# Patient Record
Sex: FEMALE | Race: WHITE | URBAN - METROPOLITAN AREA
[De-identification: names, ages, dates, MRNs, and addresses within clinical notes are randomized per-mention and may not be internally consistent; named-entity substitution may affect disease eponyms.]

---

## 2020-11-13 ENCOUNTER — WEB ENCOUNTER (OUTPATIENT)
Dept: URBAN - METROPOLITAN AREA CLINIC 23 | Facility: CLINIC | Age: 55
End: 2020-11-13

## 2020-11-13 RX ORDER — AZATHIOPRINE 50 MG/1
TAKE 2 TABLETS BY ORAL ROUTE DAILY FOR 90 DAYS TABLET ORAL 1
Qty: 180 | Refills: 6
Start: 2019-10-01

## 2020-11-14 PROBLEM — 235856003 DISEASE OF LIVER: Status: ACTIVE | Noted: 2020-11-14

## 2021-01-18 ENCOUNTER — WEB ENCOUNTER (OUTPATIENT)
Dept: URBAN - METROPOLITAN AREA CLINIC 23 | Facility: CLINIC | Age: 56
End: 2021-01-18

## 2021-11-23 ENCOUNTER — ERX REFILL RESPONSE (OUTPATIENT)
Dept: URBAN - METROPOLITAN AREA CLINIC 23 | Facility: CLINIC | Age: 56
End: 2021-11-23

## 2021-11-23 RX ORDER — AZATHIOPRINE 50 MG/1
TAKE 2 TABLETS BY ORAL ROUTE DAILY FOR 90 DAYS TABLET ORAL 1
Qty: 180 | Refills: 6 | OUTPATIENT

## 2021-11-23 RX ORDER — AZATHIOPRINE 50 1/1
TAKE TWO TABLETS BY MOUTH DAILY TABLET ORAL
Qty: 60 TABLET | Refills: 1 | OUTPATIENT

## 2021-12-29 ENCOUNTER — WEB ENCOUNTER (OUTPATIENT)
Dept: URBAN - METROPOLITAN AREA CLINIC 23 | Facility: CLINIC | Age: 56
End: 2021-12-29

## 2021-12-29 RX ORDER — AZATHIOPRINE 50 1/1
2 TABLETS TABLET ORAL ONCE A DAY
Qty: 180 TABLET | Refills: 3
Start: 2021-12-29 | End: 2022-12-24

## 2022-01-05 ENCOUNTER — WEB ENCOUNTER (OUTPATIENT)
Dept: URBAN - METROPOLITAN AREA CLINIC 23 | Facility: CLINIC | Age: 57
End: 2022-01-05

## 2022-01-05 RX ORDER — AZATHIOPRINE 50 1/1
2 TABLETS TABLET ORAL ONCE A DAY
Qty: 60 TABLET | Refills: 0
Start: 2022-01-05 | End: 2022-02-04

## 2022-01-19 ENCOUNTER — WEB ENCOUNTER (OUTPATIENT)
Dept: URBAN - METROPOLITAN AREA CLINIC 23 | Facility: CLINIC | Age: 57
End: 2022-01-19

## 2022-01-19 RX ORDER — AZATHIOPRINE 50 1/1
2 TABLETS TABLET ORAL ONCE A DAY
Qty: 60 TABLET | Refills: 0
Start: 2022-01-05 | End: 2022-02-18

## 2022-03-01 ENCOUNTER — TELEPHONE ENCOUNTER (OUTPATIENT)
Dept: URBAN - METROPOLITAN AREA CLINIC 23 | Facility: CLINIC | Age: 57
End: 2022-03-01

## 2022-03-01 RX ORDER — AZATHIOPRINE 50 1/1
2 TABLETS TABLET ORAL ONCE A DAY
Qty: 60 TABLET | Refills: 0
Start: 2022-01-05 | End: 2022-03-31

## 2022-03-15 ENCOUNTER — OFFICE VISIT (OUTPATIENT)
Dept: URBAN - METROPOLITAN AREA CLINIC 23 | Facility: CLINIC | Age: 57
End: 2022-03-15
Payer: COMMERCIAL

## 2022-03-15 ENCOUNTER — WEB ENCOUNTER (OUTPATIENT)
Dept: URBAN - METROPOLITAN AREA CLINIC 23 | Facility: CLINIC | Age: 57
End: 2022-03-15

## 2022-03-15 DIAGNOSIS — K75.4 AUTOIMMUNE HEPATITIS: ICD-10-CM

## 2022-03-15 DIAGNOSIS — K63.5 COLON POLYP: ICD-10-CM

## 2022-03-15 DIAGNOSIS — K74.69 CIRRHOSIS, CRYPTOGENIC: ICD-10-CM

## 2022-03-15 DIAGNOSIS — D69.6 THROMBOCYTOPENIA: ICD-10-CM

## 2022-03-15 PROCEDURE — 99214 OFFICE O/P EST MOD 30 MIN: CPT | Performed by: INTERNAL MEDICINE

## 2022-03-15 RX ORDER — AZATHIOPRINE 50 1/1
2 TABLETS TABLET ORAL ONCE A DAY
Qty: 60 TABLET | Refills: 0 | Status: ACTIVE | COMMUNITY
Start: 2022-01-05 | End: 2022-03-31

## 2022-03-15 NOTE — HPI-MIGRATED HPI
56-year-old female with history of autoimmune hepatitis liver cirrhosis history of alcohol use presented today for 2 years follow-up.  Patient is noncompliant with a follow-up her last lab was in November at hematology clinic because of low platelet her platelet dropped to 48, she is on Imuran 100 mg a day she could not take steroids, she had liver biopsy in 2015 showed evidence of hepatitis with increased plasma cells suspicious for autoimmune hepatitis.  She was seen at [place] although confirmed the diagnosis but she did not follow-up since then she had a colonoscopy in 2019 revealed 3 cm polyp which was removed biopsy showed sessile serrated adenoma she was supposed to repeat the colonoscopy in 2020.  She denies abdominal pain no nausea no vomiting she drinks alcohol 2-3 times a week per her report.  Her most recent platelets were 40

## 2022-03-18 ENCOUNTER — TELEPHONE ENCOUNTER (OUTPATIENT)
Dept: URBAN - METROPOLITAN AREA CLINIC 23 | Facility: CLINIC | Age: 57
End: 2022-03-18

## 2022-03-18 ENCOUNTER — LAB OUTSIDE AN ENCOUNTER (OUTPATIENT)
Dept: URBAN - METROPOLITAN AREA CLINIC 23 | Facility: CLINIC | Age: 57
End: 2022-03-18

## 2022-03-18 ENCOUNTER — WEB ENCOUNTER (OUTPATIENT)
Dept: URBAN - METROPOLITAN AREA CLINIC 23 | Facility: CLINIC | Age: 57
End: 2022-03-18

## 2022-03-18 PROBLEM — 415116008: Status: ACTIVE | Noted: 2022-03-18

## 2022-03-18 LAB
A/G RATIO: 1.3
ACTIN (SMOOTH MUSCLE) ANTIBODY: 40
ALBUMIN: 4.3
ALKALINE PHOSPHATASE: 118
APTT: 31
AST (SGOT): 54
BASO (ABSOLUTE): 0
BASOS: 1
BILIRUBIN, TOTAL: 1.4
BUN/CREATININE RATIO: 14
BUN: 9
CALCIUM: 9.1
CARBON DIOXIDE, TOTAL: 21
CHLORIDE: 103
CREATININE: 0.63
EGFR: 103
EOS (ABSOLUTE): 0
EOS: 1
GLOBULIN, TOTAL: 3.4
GLUCOSE: 102
HEMATOCRIT: 35.8
HEMATOLOGY COMMENTS:: (no result)
HEMOGLOBIN: 12.7
IMMATURE CELLS: (no result)
IMMATURE GRANS (ABS): 0
IMMATURE GRANULOCYTES: 1
INR: 1.1
LYMPHS (ABSOLUTE): 0.4
LYMPHS: 19
MCH: 39.2
MCHC: 35.5
MCV: 111
MITOCHONDRIAL (M2) ANTIBODY: <20
MONOCYTES(ABSOLUTE): 0.2
MONOCYTES: 8
NEUTROPHILS (ABSOLUTE): 1.4
NEUTROPHILS: 70
NRBC: (no result)
PLATELETS: 36
POTASSIUM: 3.8
PROTEIN, TOTAL: 7.7
PROTHROMBIN TIME: 11.8
RBC: 3.24
RDW: 14.5
SODIUM: 140
WBC: 2

## 2022-04-01 ENCOUNTER — TELEPHONE ENCOUNTER (OUTPATIENT)
Dept: URBAN - METROPOLITAN AREA CLINIC 23 | Facility: CLINIC | Age: 57
End: 2022-04-01

## 2022-05-05 ENCOUNTER — LAB OUTSIDE AN ENCOUNTER (OUTPATIENT)
Dept: URBAN - METROPOLITAN AREA CLINIC 23 | Facility: CLINIC | Age: 57
End: 2022-05-05

## 2022-05-06 ENCOUNTER — TELEPHONE ENCOUNTER (OUTPATIENT)
Dept: URBAN - METROPOLITAN AREA CLINIC 23 | Facility: CLINIC | Age: 57
End: 2022-05-06

## 2022-05-06 ENCOUNTER — WEB ENCOUNTER (OUTPATIENT)
Dept: URBAN - METROPOLITAN AREA CLINIC 23 | Facility: CLINIC | Age: 57
End: 2022-05-06

## 2022-05-06 LAB
A/G RATIO: 1.2
ALBUMIN: 4.1
ALKALINE PHOSPHATASE: 129
AST (SGOT): 49
BASO (ABSOLUTE): 0
BASOS: 1
BILIRUBIN, TOTAL: 1.6
BUN/CREATININE RATIO: 19
BUN: 12
CALCIUM: 8.5
CARBON DIOXIDE, TOTAL: 20
CHLORIDE: 103
CREATININE: 0.63
EGFR: 103
EOS (ABSOLUTE): 0
EOS: 1
GLOBULIN, TOTAL: 3.5
GLUCOSE: 148
HEMATOCRIT: 36.8
HEMATOLOGY COMMENTS:: (no result)
HEMOGLOBIN: 12.9
IMMATURE CELLS: (no result)
IMMATURE GRANS (ABS): 0
IMMATURE GRANULOCYTES: 0
LYMPHS (ABSOLUTE): 0.5
LYMPHS: 18
MCH: 38.4
MCHC: 35.1
MCV: 110
MONOCYTES(ABSOLUTE): 0.2
MONOCYTES: 8
NEUTROPHILS (ABSOLUTE): 1.9
NEUTROPHILS: 72
NRBC: (no result)
PLATELETS: 27
POTASSIUM: 4.1
PROTEIN, TOTAL: 7.6
RBC: 3.36
RDW: 13.2
SODIUM: 138
WBC: 2.7

## 2022-05-06 RX ORDER — AZATHIOPRINE 50 MG
ONE TABLET TABLET ORAL ONCE A DAY
Qty: 90 | Refills: 2 | OUTPATIENT
Start: 2022-05-07 | End: 2023-01-31

## 2022-05-08 ENCOUNTER — WEB ENCOUNTER (OUTPATIENT)
Dept: URBAN - METROPOLITAN AREA CLINIC 23 | Facility: CLINIC | Age: 57
End: 2022-05-08

## 2022-05-08 RX ORDER — AZATHIOPRINE 50 MG
TWO TABLETS TABLET ORAL ONCE A DAY
Qty: 180 | Refills: 2
Start: 2022-05-07 | End: 2023-02-03

## 2023-02-21 ENCOUNTER — TELEPHONE ENCOUNTER (OUTPATIENT)
Dept: URBAN - METROPOLITAN AREA CLINIC 23 | Facility: CLINIC | Age: 58
End: 2023-02-21

## 2023-02-21 RX ORDER — AZATHIOPRINE 50 MG
TWO TABLETS TABLET ORAL ONCE A DAY
Qty: 180 | Refills: 2
Start: 2022-05-07 | End: 2023-11-18

## 2023-02-22 PROBLEM — 255417007 CIRRHOTIC: Status: ACTIVE | Noted: 2022-03-18

## 2023-11-14 ENCOUNTER — TELEPHONE ENCOUNTER (OUTPATIENT)
Dept: URBAN - METROPOLITAN AREA CLINIC 23 | Facility: CLINIC | Age: 58
End: 2023-11-14

## 2023-11-14 RX ORDER — AZATHIOPRINE 50 MG
TWO TABLETS TABLET ORAL ONCE A DAY
Qty: 60 | Refills: 1
Start: 2022-05-07 | End: 2024-01-14

## 2023-11-15 ENCOUNTER — LAB OUTSIDE AN ENCOUNTER (OUTPATIENT)
Dept: URBAN - METROPOLITAN AREA CLINIC 23 | Facility: CLINIC | Age: 58
End: 2023-11-15

## 2023-11-15 ENCOUNTER — TELEPHONE ENCOUNTER (OUTPATIENT)
Dept: URBAN - METROPOLITAN AREA CLINIC 23 | Facility: CLINIC | Age: 58
End: 2023-11-15

## 2023-11-15 LAB
ABSOLUTE BASOPHILS: 11
ABSOLUTE EOSINOPHILS: 40
ABSOLUTE LYMPHOCYTES: 403
ABSOLUTE MONOCYTES: 179
ABSOLUTE NEUTROPHILS: 1468
ALBUMIN/GLOBULIN RATIO: 1.2
ALBUMIN: 3.7
ALKALINE PHOSPHATASE: 108
ALT: 20
AST: 47
BASOPHILS: 0.5
BILIRUBIN, TOTAL: 2
BUN/CREATININE RATIO: 17
CALCIUM: 8.7
CARBON DIOXIDE: 23
CHLORIDE: 105
CREATININE: 0.46
EOSINOPHILS: 1.9
GLOBULIN: 3.2
GLUCOSE: 134
HEMATOCRIT: 32.6
HEMOGLOBIN: 11.7
INR: 1.2
LYMPHOCYTES: 19.2
MCH: 41.2
MCHC: 35.9
MCV: 114.8
MONOCYTES: 8.5
MPV: 14.1
NEUTROPHILS: 69.9
PLATELET COUNT: 29
POTASSIUM: 3.4
PROTEIN, TOTAL: 6.9
PT: 12.7
RDW: 14.5
RED BLOOD CELL COUNT: 2.84
SODIUM: 139
UREA NITROGEN (BUN): 8
WHITE BLOOD CELL COUNT: 2.1

## 2024-01-18 ENCOUNTER — TELEPHONE ENCOUNTER (OUTPATIENT)
Dept: URBAN - METROPOLITAN AREA CLINIC 92 | Facility: CLINIC | Age: 59
End: 2024-01-18

## 2024-01-18 RX ORDER — AZATHIOPRINE 50 1/1
2 TABLETS TABLET ORAL ONCE A DAY
Qty: 60 TABLET | Refills: 0
Start: 2022-01-05 | End: 2024-02-17

## 2024-04-23 ENCOUNTER — OV EP (OUTPATIENT)
Dept: URBAN - METROPOLITAN AREA CLINIC 23 | Facility: CLINIC | Age: 59
End: 2024-04-23
Payer: COMMERCIAL

## 2024-04-23 ENCOUNTER — LAB (OUTPATIENT)
Dept: URBAN - METROPOLITAN AREA CLINIC 23 | Facility: CLINIC | Age: 59
End: 2024-04-23

## 2024-04-23 VITALS
DIASTOLIC BLOOD PRESSURE: 86 MMHG | HEART RATE: 88 BPM | BODY MASS INDEX: 33.65 KG/M2 | HEIGHT: 68 IN | SYSTOLIC BLOOD PRESSURE: 155 MMHG | WEIGHT: 222 LBS | TEMPERATURE: 97.9 F

## 2024-04-23 DIAGNOSIS — K74.60 CIRRHOSIS: ICD-10-CM

## 2024-04-23 DIAGNOSIS — D69.6 THROMBOCYTOPENIA: ICD-10-CM

## 2024-04-23 DIAGNOSIS — K63.5 COLON POLYP: ICD-10-CM

## 2024-04-23 DIAGNOSIS — K75.4 AUTOIMMUNE HEPATITIS: ICD-10-CM

## 2024-04-23 PROCEDURE — 99214 OFFICE O/P EST MOD 30 MIN: CPT | Performed by: NURSE PRACTITIONER

## 2024-04-23 RX ORDER — AZATHIOPRINE 50 MG/1
2 TABLETS TABLET ORAL TWICE A DAY
Qty: 360 TABLET | Refills: 0
End: 2024-07-22

## 2024-04-23 NOTE — HPI-MIGRATED HPI
58 year old here for follow up for mediation refill. Last follow up 2 years prior in 2022.  History of autoimmune hepatitis and liver cirrhosis diagnosed by liver biopsy in 2015 with evidence of hepatitis with increased plasma cells suspicious for autoimmune hepatitis. Managed on azathioprine 100mg a day. Prior labs with worsening thrombocytopenia, platelets down to 21k. Dosage decreased to 50mg a day for about 3 months. Now back to 100mg a day. Seen by Hematology, Dr. Love and had labs drawn yesterday with platelet count 47k. However, Tbili worsening at 2.5 from 1.7 a few months ago and 1.0 in 3/2022. No abdominal imaging for atleast 4 years.   Denies confusion, increased abdominal girth, lower extremity edema, jaundice, pruritus, GI bleeding, abdominal pain, nausea, vomiting, changes in bowel habits, changes in appetite or changes in weight.   No hospitalizations. Previously seen at [facility] but has not bee followed for several years.  Denies alcohol use. Prior history of alcohol abuse.   Colonoscopy in 2019 revealed 3 cm polyp which was removed biopsy showed sessile serrated adenoma she was supposed to repeat the colonoscopy in 2020. When seen in 2022 no colonoscopy due to worsening thrombocytopenia.

## 2024-04-23 NOTE — EXAM-PHYSICAL EXAM
General--no acute distress Eyes--anicteric, clear conjunctiva HENT--normocephalic, atraumatic head Chest--clear to auscultation anteriorly, equal chest rise and fall Heart--regular rate and rhythm Abdomen--soft, non tender, large but non distended, bowel sounds present Skin--no rashes, no jaundice, no  pallor Neurologic--Alert and oriented x 3 Psychiatric--stable mood, appropriate affect

## 2024-08-02 ENCOUNTER — ERX REFILL RESPONSE (OUTPATIENT)
Dept: URBAN - METROPOLITAN AREA CLINIC 23 | Facility: CLINIC | Age: 59
End: 2024-08-02

## 2024-08-02 RX ORDER — AZATHIOPRINE 50 1/1
TAKE 2 TABLETS BY MOUTH TWICE A DAY TABLET ORAL
Qty: 120 TABLET | Refills: 0 | OUTPATIENT

## 2024-09-02 ENCOUNTER — ERX REFILL RESPONSE (OUTPATIENT)
Dept: URBAN - METROPOLITAN AREA CLINIC 23 | Facility: CLINIC | Age: 59
End: 2024-09-02

## 2024-09-02 RX ORDER — AZATHIOPRINE 50 1/1
TAKE 2 TABLETS BY MOUTH TWICE A DAY TABLET ORAL
Qty: 120 TABLET | Refills: 0 | OUTPATIENT

## 2024-09-02 RX ORDER — AZATHIOPRINE 50 1/1
TAKE 2 TABLETS ONCE A DAY TABLET ORAL
Qty: 60 | Refills: 3 | OUTPATIENT

## 2025-05-09 ENCOUNTER — WEB ENCOUNTER (OUTPATIENT)
Dept: URBAN - METROPOLITAN AREA CLINIC 23 | Facility: CLINIC | Age: 60
End: 2025-05-09

## 2025-05-09 RX ORDER — AZATHIOPRINE 50 1/1
TAKE 2 TABLETS ONCE A DAY TABLET ORAL ONCE A DAY
Qty: 60 | Refills: 0

## 2025-05-12 ENCOUNTER — WEB ENCOUNTER (OUTPATIENT)
Dept: URBAN - METROPOLITAN AREA CLINIC 23 | Facility: CLINIC | Age: 60
End: 2025-05-12

## 2025-06-09 ENCOUNTER — DASHBOARD ENCOUNTERS (OUTPATIENT)
Age: 60
End: 2025-06-09

## 2025-06-09 ENCOUNTER — OFFICE VISIT (OUTPATIENT)
Dept: URBAN - METROPOLITAN AREA CLINIC 12 | Facility: CLINIC | Age: 60
End: 2025-06-09
Payer: COMMERCIAL

## 2025-06-09 ENCOUNTER — LAB OUTSIDE AN ENCOUNTER (OUTPATIENT)
Dept: URBAN - METROPOLITAN AREA CLINIC 12 | Facility: CLINIC | Age: 60
End: 2025-06-09

## 2025-06-09 DIAGNOSIS — Z86.0101 HISTORY OF ADENOMATOUS POLYP OF COLON: ICD-10-CM

## 2025-06-09 DIAGNOSIS — K74.60 CIRRHOSIS: ICD-10-CM

## 2025-06-09 DIAGNOSIS — K75.4 AUTOIMMUNE HEPATITIS: ICD-10-CM

## 2025-06-09 PROBLEM — 429047008: Status: ACTIVE | Noted: 2025-06-09

## 2025-06-09 PROCEDURE — 99214 OFFICE O/P EST MOD 30 MIN: CPT

## 2025-06-09 RX ORDER — AZATHIOPRINE 50 1/1
TAKE 2 TABLETS ONCE A DAY TABLET ORAL DAILY
Qty: 180 | Refills: 1

## 2025-06-09 RX ORDER — AZATHIOPRINE 50 1/1
TAKE 2 TABLETS ONCE A DAY TABLET ORAL ONCE A DAY
Qty: 60 | Refills: 0 | Status: ACTIVE | COMMUNITY

## 2025-06-09 NOTE — HPI-TODAY'S VISIT:
59-year-old female with a history of autoimmune hepatitis and cirrhosis diagnosed via liver biopsy in 2015 presents for follow-up and medication refills. Last seen on 4/23/2024 by MARVIN Webster. At that time, she was referred to [facility] for concern for worsening liver disease but reports she did not follow up. Currently on azathioprine 100 mg daily.  History of thrombocytopenia; last seen by hematology ~2 years ago. No recent labs or abdominal imaging available. Denies confusion, jaundice, N/V, abd pain, ascites, LE edema, pruritus, fatigue, changes in appetite, or weight change. Denies alcohol use. No FHx of liver disease.  Pt overdue for surveillance egd/colon.  Last Colon (2019, Dr. Adames): 30 mm serrated adenoma in transverse colon; IH noted. Repeat in 1 year was advised. EGD (2019): Irregular Z-line, chronic gastritis. Pathology showed mild chronic inactive gastritis, negative for H. pylori. Repeat EGD in 3 years advised. Patient was unable to proceed due to thrombocytopenia. Denies GERD symptoms, hematemesis, changes in bowel habits, rectal bleeding or any other conerns.

## 2025-06-10 ENCOUNTER — TELEPHONE ENCOUNTER (OUTPATIENT)
Dept: URBAN - METROPOLITAN AREA CLINIC 23 | Facility: CLINIC | Age: 60
End: 2025-06-10